# Patient Record
Sex: MALE | Race: WHITE | NOT HISPANIC OR LATINO | URBAN - NONMETROPOLITAN AREA
[De-identification: names, ages, dates, MRNs, and addresses within clinical notes are randomized per-mention and may not be internally consistent; named-entity substitution may affect disease eponyms.]

---

## 2020-11-09 ENCOUNTER — OFFICE VISIT (OUTPATIENT)
Dept: INTERNAL MEDICINE | Facility: CLINIC | Age: 75
End: 2020-11-09

## 2020-11-09 VITALS
BODY MASS INDEX: 30.19 KG/M2 | TEMPERATURE: 97.8 F | OXYGEN SATURATION: 96 % | WEIGHT: 227.8 LBS | HEIGHT: 73 IN | HEART RATE: 72 BPM | DIASTOLIC BLOOD PRESSURE: 90 MMHG | SYSTOLIC BLOOD PRESSURE: 130 MMHG

## 2020-11-09 DIAGNOSIS — J45.909 UNCOMPLICATED ASTHMA, UNSPECIFIED ASTHMA SEVERITY, UNSPECIFIED WHETHER PERSISTENT: Primary | ICD-10-CM

## 2020-11-09 DIAGNOSIS — R53.83 OTHER FATIGUE: ICD-10-CM

## 2020-11-09 DIAGNOSIS — I10 ESSENTIAL HYPERTENSION: ICD-10-CM

## 2020-11-09 DIAGNOSIS — J45.20 MILD INTERMITTENT ASTHMA WITHOUT COMPLICATION: ICD-10-CM

## 2020-11-09 DIAGNOSIS — R35.1 NOCTURIA: ICD-10-CM

## 2020-11-09 PROCEDURE — 99203 OFFICE O/P NEW LOW 30 MIN: CPT | Performed by: INTERNAL MEDICINE

## 2020-11-09 RX ORDER — ALBUTEROL SULFATE 90 UG/1
2 AEROSOL, METERED RESPIRATORY (INHALATION) EVERY 4 HOURS PRN
Qty: 18 G | Refills: 3 | Status: SHIPPED | OUTPATIENT
Start: 2020-11-09 | End: 2021-04-05 | Stop reason: SDUPTHER

## 2020-11-09 NOTE — PROGRESS NOTES
Subjective   Ronald Burris is a 75 y.o. male.     Chief Complaint   Patient presents with   • Establish Care   • Lyme Disease     pt states that he got bit by a tick 2 years ago and would like to get checked for lyme disease    • Med Refill     pt need refill on albuterol inhaler        History of Present Illness   Patient here for follow-up.  Asthma comes and goes to patient needs medication refill albuterol.  Blood pressure mildly elevated.  Also  patient complains of feeling tired no energy.  Also get up at night to urinate a lot.  Patient also complains 2 years ago had the rash in the ankle same cervical typical Lyme's disease rash per patient.  Patient needs blood test to be done.    Current Outpatient Medications:   •  albuterol sulfate  (90 Base) MCG/ACT inhaler, Inhale 2 puffs Every 4 (Four) Hours As Needed for Wheezing., Disp: 18 g, Rfl: 3    The following portions of the patient's history were reviewed and updated as appropriate: allergies, current medications, past family history, past medical history, past social history, past surgical history and problem list.    Review of Systems   Constitutional: Positive for fatigue.   Respiratory: Negative.    Cardiovascular: Negative.    Gastrointestinal: Negative.    Musculoskeletal: Negative.    Skin: Negative.    Neurological: Negative.    Psychiatric/Behavioral: Negative.        Objective   Physical Exam  Neck:      Musculoskeletal: Neck supple.   Cardiovascular:      Rate and Rhythm: Normal rate and regular rhythm.      Heart sounds: Normal heart sounds.   Pulmonary:      Effort: Pulmonary effort is normal.      Breath sounds: Normal breath sounds.   Abdominal:      General: Bowel sounds are normal.   Skin:     General: Skin is warm.   Neurological:      Mental Status: He is alert and oriented to person, place, and time.         All tests have been reviewed.    Assessment/Plan   Diagnoses and all orders for this visit:      Mild intermittent  asthma without complication  -     albuterol sulfate  (90 Base) MCG/ACT inhaler; Inhale 2 puffs Every 4 (Four) Hours As Needed for Wheezing.    Essential hypertension need a good diet lower the salt  -     CBC & Differential  -     Comprehensive Metabolic Panel  -     Lipid Panel  -     TSH  -     PSA DIAGNOSTIC    Other fatigue do blood tests  -     CBC & Differential  -     Comprehensive Metabolic Panel  -     Lipid Panel  -     TSH  -     PSA DIAGNOSTIC  -     Lyme, Total Antibody Test / Reflex  -     Vitamin B12  -     C-reactive Protein  -     Sedimentation Rate  -     Folate    Nocturia 2 blood tests  -     PSA DIAGNOSTIC    Suspicion for Lyme's disease 2 blood tests      2 week after labs for f/u and ?wellness patient may not want to have wellness check next time

## 2021-05-13 ENCOUNTER — OFFICE VISIT (OUTPATIENT)
Dept: INTERNAL MEDICINE | Facility: CLINIC | Age: 76
End: 2021-05-13

## 2021-05-13 VITALS
SYSTOLIC BLOOD PRESSURE: 122 MMHG | HEIGHT: 73 IN | OXYGEN SATURATION: 97 % | WEIGHT: 226.8 LBS | DIASTOLIC BLOOD PRESSURE: 82 MMHG | TEMPERATURE: 97.8 F | BODY MASS INDEX: 30.06 KG/M2 | HEART RATE: 86 BPM

## 2021-05-13 DIAGNOSIS — J45.20 MILD INTERMITTENT ASTHMA WITHOUT COMPLICATION: Primary | ICD-10-CM

## 2021-05-13 DIAGNOSIS — R09.89 GLOBUS SENSATION: ICD-10-CM

## 2021-05-13 DIAGNOSIS — R49.9 HOARSENESS OR CHANGING VOICE: ICD-10-CM

## 2021-05-13 LAB
CRP SERPL-MCNC: <0.3 MG/DL (ref 0–0.5)
ERYTHROCYTE [SEDIMENTATION RATE] IN BLOOD BY WESTERGREN METHOD: 6 MM/HR (ref 0–20)
FOLATE SERPL-MCNC: 6.34 NG/ML (ref 4.78–24.2)
VIT B12 SERPL-MCNC: 367 PG/ML (ref 211–946)

## 2021-05-13 PROCEDURE — 99214 OFFICE O/P EST MOD 30 MIN: CPT | Performed by: NURSE PRACTITIONER

## 2021-05-13 RX ORDER — FLUTICASONE PROPIONATE 44 MCG
1 AEROSOL WITH ADAPTER (GRAM) INHALATION
Qty: 10.6 G | Refills: 0 | Status: SHIPPED | OUTPATIENT
Start: 2021-05-13 | End: 2021-06-07

## 2021-05-13 NOTE — PROGRESS NOTES
"  Office Visit      Patient Name: Ronald Burris  : 1945   MRN: [id number]   Care Team: Patient Care Team:  Efren Lu MD as PCP - General (Internal Medicine)    Chief Complaint  Asthma    Subjective     Subjective      Ronald Burris presents to [facility] for asthma complaints. Went to urgent care 2 days ago for refills on his albuterol. Has been using albuterol up to 5 times per day. He was given a medrol pack and now down to 2 times daily. Complains of a globus sensation in his throat and feels like this is the reason his asthma is worsening. Also admit his voice may have changed since the symptoms began.  It has gotten better since he started the medrol pack and he wishes to be on a daily oral steroid. He has been on inhaled corticosteroids in the past for his asthma and didn't like them states, \"they made my mouth have an odd taste\". He admits to non-compliance and doesn't like going to doctors but he is worried he may have a polyp in his throat. Requesting referral to get this evaluated ASAP. Is a previous smoker, quit in . Seen PCP in November and labs were ordered and never drawn. Denies reflux, indigestion, shortness of breath, wheezing, coughing, and chest pain today.     Review of Systems   Constitutional: Negative for fatigue, fever, unexpected weight gain and unexpected weight loss.   HENT: Positive for voice change. Negative for sore throat and trouble swallowing.    Eyes: Negative for visual disturbance.   Respiratory: Positive for shortness of breath (intermittently). Negative for cough and wheezing.    Cardiovascular: Negative for chest pain, palpitations and leg swelling.   Gastrointestinal: Negative for abdominal pain, blood in stool, constipation, diarrhea and nausea.   Endocrine: Negative for polydipsia, polyphagia and polyuria.   Musculoskeletal: Negative for arthralgias, back pain and myalgias.   Skin: Negative for rash. " "  Neurological: Negative for dizziness, weakness, light-headedness and headache.   Psychiatric/Behavioral: Negative for sleep disturbance and depressed mood. The patient is not nervous/anxious.        Objective     Objective   Vital Signs:   /82   Pulse 86   Temp 97.8 °F (36.6 °C) (Temporal)   Ht 185.4 cm (73\")   Wt 103 kg (226 lb 12.8 oz)   SpO2 97%   BMI 29.92 kg/m²     Physical Exam  Vitals and nursing note reviewed.   Constitutional:       General: He is not in acute distress.     Appearance: Normal appearance. He is not toxic-appearing.   HENT:      Mouth/Throat:      Mouth: Mucous membranes are moist.      Pharynx: Posterior oropharyngeal erythema present.   Eyes:      Pupils: Pupils are equal, round, and reactive to light.   Neck:      Thyroid: No thyroid mass or thyromegaly.      Vascular: No carotid bruit.   Cardiovascular:      Rate and Rhythm: Normal rate and regular rhythm.      Heart sounds: Normal heart sounds. No murmur heard.     Pulmonary:      Effort: Pulmonary effort is normal. No respiratory distress.      Breath sounds: Normal breath sounds. No wheezing.   Abdominal:      General: Bowel sounds are normal. There is no distension.      Palpations: Abdomen is soft.      Tenderness: There is no abdominal tenderness.   Musculoskeletal:         General: Normal range of motion.      Cervical back: Neck supple. No tenderness.   Lymphadenopathy:      Cervical: No cervical adenopathy.   Skin:     General: Skin is warm and dry.      Findings: No rash.   Neurological:      General: No focal deficit present.      Mental Status: He is alert.   Psychiatric:         Mood and Affect: Mood normal.         Behavior: Behavior normal.          Assessment / Plan      Assessment/Plan   Problem List Items Addressed This Visit        Pulmonary and Pneumonias    Mild intermittent asthma without complication - Primary    Relevant Medications    fluticasone (Flovent HFA) 44 MCG/ACT inhaler      Other Visit " Diagnoses     Globus sensation        Relevant Orders    Ambulatory Referral to ENT (Otolaryngology)    Hoarseness or changing voice        Discussed dangers of overuse of albuterol including increased risk of death and rebound bronchospasm. I strongly suggest daily inhaled corticosteroid for his asthma management. Discussed side effects and proper administration. Encouraged to rinse mouth after each use to prevent thrush. Daily oral steroids not recommended and urged to finish steroid pack for acute exacerbation. Differentials for lump in throat include bronchospasm, GERD, or less likely tumor/mass. Referred to ENT for evaluation today. He is uninterested in taking daily PPI or H2 blocker. Worsening signs/symptoms discussed and he will RTC if he should develop. I have urged him to have labs drawn as previously ordered by PCP, states he may do this.            Follow Up   Return in about 4 weeks (around 6/10/2021), or if symptoms worsen or fail to improve.  Patient was given instructions and counseling regarding his condition or for health maintenance advice. Please see specific information pulled into the AVS if appropriate.     LINNEA De La Fuente  [facility]

## 2021-05-19 ENCOUNTER — TELEPHONE (OUTPATIENT)
Dept: INTERNAL MEDICINE | Facility: CLINIC | Age: 76
End: 2021-05-19

## 2021-05-19 NOTE — TELEPHONE ENCOUNTER
Caller: Ronald Burris    Relationship: Self    Best call back number:  [phone]    Caller requesting test results: PATIENT     What test was performed: BLOOD WORK     When was the test performed: LAST WEEK     Additional notes: PLEASE CALL WITH RESULTS

## 2021-05-20 NOTE — TELEPHONE ENCOUNTER
Called and spoke to the patient he is aware of results as well as, recommendations. He expressed understanding.

## 2021-05-20 NOTE — TELEPHONE ENCOUNTER
These were ordered by Dr. Lu so didn't come to my in basket. Cholesterol is high, needs to follow low cholesterol diet. His white blood cell count was elevated indicating inflammation or infection, numbers are likely skewed due to him being on steroid therapy at the time of the lab draw. Only mildly elevated but will need to follow-up in 1 month for CBC. Other labs were normal.